# Patient Record
Sex: MALE | Race: WHITE | URBAN - METROPOLITAN AREA
[De-identification: names, ages, dates, MRNs, and addresses within clinical notes are randomized per-mention and may not be internally consistent; named-entity substitution may affect disease eponyms.]

---

## 2017-02-02 DIAGNOSIS — F90.0 ATTENTION DEFICIT HYPERACTIVITY DISORDER (ADHD), PREDOMINANTLY INATTENTIVE TYPE: Primary | ICD-10-CM

## 2017-02-02 RX ORDER — METHYLPHENIDATE HYDROCHLORIDE 10 MG/1
10 CAPSULE, EXTENDED RELEASE ORAL EVERY MORNING
Qty: 30 CAPSULE | Refills: 0 | Status: SHIPPED | OUTPATIENT
Start: 2017-02-02 | End: 2017-03-01

## 2017-02-02 NOTE — TELEPHONE ENCOUNTER
Reason for call: Medication   If this is a refill request, has the caller requested the refill from the pharmacy already? No  Will the patient be using a [facility]? No  Name of the pharmacy and phone number for the current request: They pick this script up at clinic    Name of the medication requested: Metadate    Other request: Please call leah Hwang at [phone] after this approved    Phone Number Pt can be reached at: Other phone number:  [phone]  Best Time: anytime  Can we leave a detailed message on this number? YES

## 2017-02-02 NOTE — TELEPHONE ENCOUNTER
methylphenidate (METADATE CD) 10 MG CR capsule      Last Written Prescription Date:  12/28/2016  Last Fill Quantity: 30,   # refills: 0  Last [place] with [facility] or [facility] prescribing provider: 9/07/2016  Future Office visit:       Routing refill request to provider for review/approval because:  Drug not on the [facility] or [facility] refill protocol or controlled substance

## 2017-02-27 ENCOUNTER — OFFICE VISIT (OUTPATIENT)
Dept: INTERNAL MEDICINE | Facility: CLINIC | Age: 22
End: 2017-02-27
Payer: COMMERCIAL

## 2017-02-27 VITALS
OXYGEN SATURATION: 98 % | BODY MASS INDEX: 30.71 KG/M2 | HEART RATE: 104 BPM | WEIGHT: 202.6 LBS | DIASTOLIC BLOOD PRESSURE: 74 MMHG | TEMPERATURE: 98.5 F | HEIGHT: 68 IN | SYSTOLIC BLOOD PRESSURE: 104 MMHG

## 2017-02-27 DIAGNOSIS — R68.89 FLU-LIKE SYMPTOMS: ICD-10-CM

## 2017-02-27 DIAGNOSIS — R50.9 FEVER, UNSPECIFIED: Primary | ICD-10-CM

## 2017-02-27 LAB
FLUAV+FLUBV AG SPEC QL: NEGATIVE
FLUAV+FLUBV AG SPEC QL: NORMAL
SPECIMEN SOURCE: NORMAL

## 2017-02-27 PROCEDURE — 87804 INFLUENZA ASSAY W/OPTIC: CPT | Mod: 59 | Performed by: PHYSICIAN ASSISTANT

## 2017-02-27 PROCEDURE — 99213 OFFICE O/P EST LOW 20 MIN: CPT | Performed by: PHYSICIAN ASSISTANT

## 2017-02-27 NOTE — NURSING NOTE
"Chief Complaint   Patient presents with     Cough     x4 days      Fever     x4 days        Initial /74 (BP Location: Left arm, Patient Position: Chair, Cuff Size: Adult Regular)  Pulse 104  Temp 98.5  F (36.9  C) (Oral)  Ht 5' 8\" (1.727 m)  Wt 202 lb 9.6 oz (91.9 kg)  SpO2 98%  BMI 30.81 kg/m2 Estimated body mass index is 30.81 kg/(m^2) as calculated from the following:    Height as of this encounter: 5' 8\" (1.727 m).    Weight as of this encounter: 202 lb 9.6 oz (91.9 kg).  Medication Reconciliation: complete    "

## 2017-02-27 NOTE — MR AVS SNAPSHOT
"              After Visit Summary   2017    Emeterio Mishra    MRN: [id number]           Patient Information     Date Of Birth          1995        Visit Information        Provider Department      2017 1:00 PM Lavern Cobb PA-C [facility]        Today's Diagnoses     Fever, unspecified    -  1    Flu-like symptoms          Care Instructions    Delsym -   Mucinex DM - tab         Follow-ups after your visit        Who to contact     If you have questions or need follow up information about today's clinic visit or your schedule please contact [facility] directly at [phone].  Normal or non-critical lab and imaging results will be communicated to you by MyChart, letter or phone within 4 business days after the clinic has received the results. If you do not hear from us within 7 days, please contact the clinic through MyChart or phone. If you have a critical or abnormal lab result, we will notify you by phone as soon as possible.  Submit refill requests through [employer] or call your pharmacy and they will forward the refill request to us. Please allow 3 business days for your refill to be completed.          Additional Information About Your Visit        MyChart Information     [employer] lets you send messages to your doctor, view your test results, renew your prescriptions, schedule appointments and more. To sign up, go to [email or web address] . Click on \"Log in\" on the left side of the screen, which will take you to the Welcome page. Then click on \"Sign up Now\" on the right side of the page.     You will be asked to enter the access code listed below, as well as some personal information. Please follow the directions to create your username and password.     Your access code is: [email or web address]  Expires: 2017  1:39 PM     Your access code will  in 90 days. If you need help or a new code, please call your [facility] or " "[phone].        Care EveryWhere ID     This is your Care EveryWhere ID. This could be used by other organizations to access your [place] medical records  [id number]        Your Vitals Were     Pulse Temperature Height Pulse Oximetry BMI (Body Mass Index)       104 98.5  F (36.9  C) (Oral) 5' 8\" (1.727 m) 98% 30.81 kg/m2        Blood Pressure from Last 3 Encounters:   02/27/17 104/74   09/07/16 118/88   08/11/16 126/84    Weight from Last 3 Encounters:   02/27/17 202 lb 9.6 oz (91.9 kg)   09/07/16 198 lb (89.8 kg)   08/11/16 203 lb (92.1 kg)              We Performed the Following     Influenza A/B antigen        Primary Care Provider Office Phone # Fax #    Kenny Stubbs [phone]       [facility] [address] [facility] [address]        Thank you!     Thank you for choosing [facility]  for your care. Our goal is always to provide you with excellent care. Hearing back from our patients is one way we can continue to improve our services. Please take a few minutes to complete the written survey that you may receive in the mail after your visit with us. Thank you!             Your Updated Medication List - Protect others around you: Learn how to safely use, store and throw away your medicines at www.disposemymeds.org.          This list is accurate as of: 2/27/17  1:39 PM.  Always use your most recent med list.                   Brand Name Dispense Instructions for use    albuterol 108 (90 BASE) MCG/ACT Inhaler    PROAIR HFA/PROVENTIL HFA/VENTOLIN HFA    1 Inhaler    Inhale 2 puffs into the lungs every 6 hours as needed for shortness of breath / dyspnea or wheezing       citalopram 20 MG tablet    celeXA    90 tablet    Take 1 tablet (20 mg) by mouth daily       CLARITIN 10 MG tablet   Generic drug:  loratadine      Take 10 mg by mouth daily.       methylphenidate 10 MG CR capsule    METADATE CD    30 capsule    Take 1 capsule (10 mg) by mouth " every morning

## 2017-02-27 NOTE — PROGRESS NOTES
"  SUBJECTIVE:                                                    Emeterio Mishra is a 21 year old male who presents to clinic today for the following health issues:      Acute Illness   Acute illness concerns: cough/fever/weak  Onset: x4 days worsening symptoms of chills sweats.   Started with coughing and sore throat last week about 6 days ago     Fever: YES last night 100.4    Chills/Sweats: YES    Headache (location?): YES- a little bit    Sinus Pressure:no    Conjunctivitis:  no    Ear Pain: YES: both- pressure    Rhinorrhea: no     Congestion: yes chest    Sore Throat: YES     Cough: YES- some phlegm noted.    Wheeze: YES- a little bit    Decreased Appetite: YES    Nausea: no     Vomiting: no    Diarrhea:  no    Dysuria/Freq.: no    Fatigue/Achiness: YES    Sick/Strep Exposure: YES- step brother      Therapies Tried and outcome: ibuprofen   Robitussin       -------------------------------------    Problem list and histories reviewed & adjusted, as indicated.  Additional history: as documented    Labs reviewed in EPIC  Problem list, Medication list, Allergies, and Medical/Social/Surgical histories reviewed in [facility] and updated as appropriate.    ROS:  Constitutional, HEENT, cardiovascular, pulmonary, gi and gu systems are negative, except as otherwise noted.    OBJECTIVE:                                                    /74 (BP Location: Left arm, Patient Position: Chair, Cuff Size: Adult Regular)  Pulse 104  Temp 98.5  F (36.9  C) (Oral)  Ht 5' 8\" (1.727 m)  Wt 202 lb 9.6 oz (91.9 kg)  SpO2 98%  BMI 30.81 kg/m2  Body mass index is 30.81 kg/(m^2).  GENERAL: healthy, alert and no distress  HENT: normal cephalic/atraumatic, ear canals and TM's normal, nose and mouth without ulcers or lesions, rhinorrhea clear, oropharynx clear and oral mucous membranes moist  NECK: no adenopathy, no asymmetry, masses, or scars and thyroid normal to palpation  RESP: lungs clear to auscultation - no rales, rhonchi or " wheezes  CV: regular rate and rhythm, normal S1 S2, no S3 or S4, no murmur, click or rub, no peripheral edema and peripheral pulses strong  MS: no gross musculoskeletal defects noted, no edema  SKIN: no suspicious lesions or rashes    Diagnostic Test Results:  Results for orders placed or performed in visit on 02/27/17 (from the past 24 hour(s))   Influenza A/B antigen   Result Value Ref Range    Influenza A/B Agn Specimen Nasopharyngeal     Influenza A Negative NEG    Influenza B  NEG     Negative   Test results must be correlated with clinical data. If necessary, results   should be confirmed by a molecular assay or viral culture.          ASSESSMENT/PLAN:                                                            1. Fever, unspecified    - Influenza A/B antigen    2. Flu-like symptoms    Fluids rest and monitor  Home until no fever x 24 hours  Recheck prn not improving     See Patient Instructions    Lavern Cobb PA-C  [facility]

## 2017-03-01 DIAGNOSIS — F90.0 ATTENTION DEFICIT HYPERACTIVITY DISORDER (ADHD), PREDOMINANTLY INATTENTIVE TYPE: ICD-10-CM

## 2017-03-01 RX ORDER — METHYLPHENIDATE HYDROCHLORIDE 10 MG/1
10 CAPSULE, EXTENDED RELEASE ORAL EVERY MORNING
Qty: 30 CAPSULE | Refills: 0 | Status: SHIPPED | OUTPATIENT
Start: 2017-03-01 | End: 2017-05-22

## 2017-03-01 NOTE — TELEPHONE ENCOUNTER
Patient needs paper prescription for his medication metadate needs by Friday 3/3/17 please address asap please call pts mom let her know when this is ready phone [phone] will pickup at front in im

## 2017-03-01 NOTE — TELEPHONE ENCOUNTER
methylphenidate (METADATE CD) 10 MG CR capsule  Last Written Prescription Date:  2/2/17  Last Fill Quantity: 30,   # refills: 0  Last [place] [facility] [facility] or [facility] prescribing provider: 8/11/16  Future Office visit:       Routing refill request to provider for review/approval because:  Drug not on the [facility], [facility] or [facility] refill protocol or controlled substance

## 2017-04-24 ENCOUNTER — OFFICE VISIT (OUTPATIENT)
Dept: INTERNAL MEDICINE | Facility: CLINIC | Age: 22
End: 2017-04-24
Payer: COMMERCIAL

## 2017-04-24 VITALS
BODY MASS INDEX: 30.74 KG/M2 | OXYGEN SATURATION: 96 % | DIASTOLIC BLOOD PRESSURE: 82 MMHG | WEIGHT: 202.2 LBS | SYSTOLIC BLOOD PRESSURE: 116 MMHG | TEMPERATURE: 98.4 F | HEART RATE: 77 BPM

## 2017-04-24 DIAGNOSIS — J18.9 PNEUMONIA DUE TO INFECTIOUS ORGANISM, UNSPECIFIED LATERALITY, UNSPECIFIED PART OF LUNG: Primary | ICD-10-CM

## 2017-04-24 PROCEDURE — 99213 OFFICE O/P EST LOW 20 MIN: CPT | Performed by: PHYSICIAN ASSISTANT

## 2017-04-24 RX ORDER — DOXYCYCLINE 100 MG/1
100 CAPSULE ORAL 2 TIMES DAILY
Qty: 6 CAPSULE | Refills: 0 | Status: SHIPPED | OUTPATIENT
Start: 2017-04-24

## 2017-04-24 NOTE — PROGRESS NOTES
SUBJECTIVE:                                                    Emeterio Mishra is a 21 year old male who presents to clinic today for the following health issues:      [facility]. He was running a high fever of 102.2 on Friday. He went through the weekend with a low grade fever, and then [facility] and based off of the symptoms he told them they had told them he had pneumonia.       RESPIRATORY SYMPTOMS      Duration: 3 days     fevers and shortness of breath started the illness     Description  Vomiting/ diarrhea x 1 day later- fever yet that day.       Severity: moderate    Accompanying signs and symptoms: no wheezing noted, Has not used his inhaler     History (predisposing factors):  Painful respiration this weekend. SOB     Precipitating or alleviating factors: started on doxycycline for pneumonia  [facility] for a 7 day course only     Therapies tried and outcome:  Feels that SOB is better today.     Fever is better today.        Mother with to appt today too.   Both with questions/ does he need an xray today or not. And concerns about frequent illness. - last seen here 2 months ago for febrile illness - flu test neg then.   Stress with work and with living situation.         Problem list and histories reviewed & adjusted, as indicated.  Additional history: as documented    Labs reviewed in EPIC    Reviewed and updated as needed this visit by clinical staff  Allergies       Reviewed and updated as needed this visit by Provider         ROS:  Constitutional, HEENT, cardiovascular, pulmonary, gi and gu systems are negative, except as otherwise noted.    OBJECTIVE:                                                    /82 (BP Location: Left arm, Patient Position: Chair)  Pulse 77  Temp 98.4  F (36.9  C)  Wt 202 lb 3.2 oz (91.7 kg)  SpO2 96%  BMI 30.74 kg/m2  Body mass index is 30.74 kg/(m^2).  GENERAL: healthy, alert and no distress  HENT: ear  canals and TM's normal, nose and mouth without ulcers or lesions  NECK: no adenopathy, no asymmetry, masses, or scars and thyroid normal to palpation  RESP: no wheezes and rhonchi bilateral and upper left and right posterior chest.     CV: regular rates and rhythm and normal S1 S2, no S3 or S4  SKIN: no suspicious lesions or rashes    Diagnostic Test Results:  none      ASSESSMENT/PLAN:                                                            1. Pneumonia due to infectious organism, unspecified laterality, unspecified part of lung    - XR Chest 2 Views; Future  - doxycycline (VIBRAMYCIN) 100 MG capsule; Take 1 capsule (100 mg) by mouth 2 times daily  Dispense: 6 capsule; Refill: 0    Reviewed options regarding xrays- at this point clinically has pneumonia, and is responding to abx. No fever today.   Would add additional 3 days of therapy though - done  Xrays in 2 weeks if still with coughing, and would need appt after to review results if abnormal. ( concern today about out of pocket costs)  Discussion about illnesses this winter. - no work up at this point, but if ill this summer would be more concerned.         Lavern Cobb PA-C  [facility]

## 2017-04-24 NOTE — MR AVS SNAPSHOT
"              After Visit Summary   4/24/2017    Emeterio Mishra    MRN: [id number]           Patient Information     Date Of Birth          1995        Visit Information        Provider Department      4/24/2017 4:40 PM Lavern Cobb PA-C [facility]        Today's Diagnoses     Pneumonia due to infectious organism, unspecified laterality, unspecified part of lung    -  1      Care Instructions    Additional 3 days of antibiotics   If not totally improved in 2 weeks then suggest an xray - order placed - You will need to call.         Follow-ups after your visit        Future tests that were ordered for you today     Open Future Orders        Priority Expected Expires Ordered    XR Chest 2 Views STAT 5/8/2017 4/24/2018 4/24/2017            Who to contact     If you have questions or need follow up information about today's clinic visit or your schedule please contact [facility] directly at [phone].  Normal or non-critical lab and imaging results will be communicated to you by [employer], letter or phone within 4 business days after the clinic has received the results. If you do not hear from us within 7 days, please contact the clinic through [employer] or phone. If you have a critical or abnormal lab result, we will notify you by phone as soon as possible.  Submit refill requests through [employer] or call your pharmacy and they will forward the refill request to us. Please allow 3 business days for your refill to be completed.          Additional Information About Your Visit        [email or web address] Information     [employer] lets you send messages to your doctor, view your test results, renew your prescriptions, schedule appointments and more. To sign up, go to [email or web address] . Click on \"Log in\" on the left side of the screen, which will take you to the Welcome page. Then click on \"Sign up Now\" on the right side of the page.     You will be asked to enter " the access code listed below, as well as some personal information. Please follow the directions to create your username and password.     Your access code is: [email or web address]  Expires: 2017  2:39 PM     Your access code will  in 90 days. If you need help or a new code, please call your [place] clinic or [phone].        Care EveryWhere ID     This is your Care EveryWhere ID. This could be used by other organizations to access your [place] medical records  [id number]         Blood Pressure from Last 3 Encounters:   17 104/74   16 118/88   16 126/84    Weight from Last 3 Encounters:   17 202 lb 9.6 oz (91.9 kg)   16 198 lb (89.8 kg)   16 203 lb (92.1 kg)                 Today's Medication Changes          These changes are accurate as of: 17  5:10 PM.  If you have any questions, ask your nurse or doctor.               Start taking these medicines.        Dose/Directions    doxycycline 100 MG capsule   Commonly known as:  VIBRAMYCIN   Used for:  Pneumonia due to infectious organism, unspecified laterality, unspecified part of lung        Dose:  100 mg   Take 1 capsule (100 mg) by mouth 2 times daily   Quantity:  6 capsule   Refills:  0            Where to get your medicines      These medications were sent to [facility] [address] [address]     Phone:  [phone]     doxycycline 100 MG capsule                Primary Care Provider Office Phone # Fax #    Kenny Stubbs [phone]       [address] [address]        Thank you!     Thank you for choosing [facility]  for your care. Our goal is always to provide you with excellent care. Hearing back from our patients is one way we can continue to improve our services. Please take a few minutes to complete the written survey that you may receive in the mail after  your visit with us. Thank you!             Your Updated Medication List - Protect others around you: Learn how to safely use, store and throw away your medicines at www.disposemymeds.org.          This list is accurate as of: 4/24/17  5:10 PM.  Always use your most recent med list.                   Brand Name Dispense Instructions for use    albuterol 108 (90 BASE) MCG/ACT Inhaler    PROAIR HFA/PROVENTIL HFA/VENTOLIN HFA    1 Inhaler    Inhale 2 puffs into the lungs every 6 hours as needed for shortness of breath / dyspnea or wheezing       citalopram 20 MG tablet    celeXA    90 tablet    Take 1 tablet (20 mg) by mouth daily       CLARITIN 10 MG tablet   Generic drug:  loratadine      Take 10 mg by mouth daily.       doxycycline 100 MG capsule    VIBRAMYCIN    6 capsule    Take 1 capsule (100 mg) by mouth 2 times daily       methylphenidate 10 MG CR capsule    METADATE CD    30 capsule    Take 1 capsule (10 mg) by mouth every morning

## 2017-04-24 NOTE — NURSING NOTE
"Chief Complaint   Patient presents with     [facility] F/U     [facility]       Initial There were no vitals taken for this visit. Estimated body mass index is 30.81 kg/(m^2) as calculated from the following:    Height as of 2/27/17: 5' 8\" (1.727 m).    Weight as of 2/27/17: 202 lb 9.6 oz (91.9 kg).  Medication Reconciliation: complete    "

## 2017-04-24 NOTE — PATIENT INSTRUCTIONS
Additional 3 days of antibiotics   If not totally improved in 2 weeks then suggest an xray - order placed - You will need to call.

## 2017-05-22 DIAGNOSIS — F90.0 ATTENTION DEFICIT HYPERACTIVITY DISORDER (ADHD), PREDOMINANTLY INATTENTIVE TYPE: ICD-10-CM

## 2017-05-22 RX ORDER — METHYLPHENIDATE HYDROCHLORIDE 10 MG/1
10 CAPSULE, EXTENDED RELEASE ORAL EVERY MORNING
Qty: 30 CAPSULE | Refills: 0 | Status: SHIPPED | OUTPATIENT
Start: 2017-05-22 | End: 2017-06-29

## 2017-05-22 NOTE — TELEPHONE ENCOUNTER
Reason for call: Medication   If this is a refill request, has the caller requested the refill from the pharmacy already? No  Will the patient be using a [facility] [facility]? No  Name of the pharmacy and phone number for the current request: patient will pick this up at [facility] on the second floor desk    Name of the medication requested: Metadate    Other request: Please call leah Chamberlain when approved, so she can let patient pick it up, as he is out right now.    Phone Number Pt can be reached at:[phone] leah Chamberlain  Best Time: anytime  Can we leave a detailed message on this number? YES

## 2017-05-22 NOTE — TELEPHONE ENCOUNTER
methylphenidate (METADATE CD) 10 MG CR capsule  Last Written Prescription Date: 03/01/2017  Last Fill Quantity: 30,  # refills: 0   Last Office Visit with [facility], L[facility] or [facility] prescribing provider: 04/24/2017

## 2017-06-29 DIAGNOSIS — F90.0 ATTENTION DEFICIT HYPERACTIVITY DISORDER (ADHD), PREDOMINANTLY INATTENTIVE TYPE: ICD-10-CM

## 2017-06-29 RX ORDER — METHYLPHENIDATE HYDROCHLORIDE 10 MG/1
10 CAPSULE, EXTENDED RELEASE ORAL EVERY MORNING
Qty: 30 CAPSULE | Refills: 0 | Status: SHIPPED | OUTPATIENT
Start: 2017-06-29 | End: 2017-08-07

## 2017-06-29 NOTE — TELEPHONE ENCOUNTER
Controlled Substance Refill Request for methylphenidate (METADATE CD) 10 MG CR capsule  Problem List Complete:  Yes    Last Written Prescription Date:  5/22/2017  Last Fill Quantity: 30,   # refills: 0    Last Office Visit with [facility] primary care provider: 4/24/2017    Clinic visit frequency required: Q 6  months     Future Office visit:     Controlled substance agreement on file: Yes:  Date 8/11/2016.     Processing:  Patient will  in clinic   checked in past 6 months?  No, route to RN     Noted:  8/11/2016       Priority:  Medium      Overview Signed 8/11/2016  4:18 PM by Antelmo Stubbs MD     Patient is followed by ANTELMO STUBBS for ongoing prescription of stimulants.  All refills should be approved by this provider, or covering partner.     Medication(s): Adderal.   Maximum quantity per month: 30  Clinic visit frequency required: Q 6  months      Controlled substance agreement on file: Yes       Date(s): 8/11/16  Neuropsych evaluation for ADD completed:  Yes, completed in past, not on file     Last [facility] website verification:  none   [email or web address]     Please place script at IM  and call pts mom Cintia @ [phone]

## 2017-08-07 DIAGNOSIS — F90.0 ATTENTION DEFICIT HYPERACTIVITY DISORDER (ADHD), PREDOMINANTLY INATTENTIVE TYPE: ICD-10-CM

## 2017-08-07 RX ORDER — METHYLPHENIDATE HYDROCHLORIDE 10 MG/1
10 CAPSULE, EXTENDED RELEASE ORAL EVERY MORNING
Qty: 30 CAPSULE | Refills: 0 | Status: SHIPPED | OUTPATIENT
Start: 2017-08-07 | End: 2017-09-18

## 2017-08-07 NOTE — TELEPHONE ENCOUNTER
Controlled Substance Refill Request for methylphenidate  Problem List Complete:  Yes    Last Written Prescription Date:  06/29/17  Last Fill Quantity: 30,   # refills: 0    Last Office Visit with [facility] primary care provider: 05/23/17    Clinic visit frequency required: Q 3 months     Future Office visit: 0    Controlled substance agreement on file: Yes:  Date 08/11/16.     Processing:  Patient will  in clinic     checked in past 6 months?  No, route to RN

## 2017-09-18 DIAGNOSIS — F90.0 ATTENTION DEFICIT HYPERACTIVITY DISORDER (ADHD), PREDOMINANTLY INATTENTIVE TYPE: ICD-10-CM

## 2017-09-18 NOTE — TELEPHONE ENCOUNTER
Mother will like call when rx is ready to  @ [phone]    methylphenidate (METADATE CD) 10 MG CR capsule      Last Written Prescription Date:  08/17/2017  Last Fill Quantity: 30,   # refills: 0  Last Office Visit with [facility], [facility] or [facility] prescribing provider: 05/23/2017  Future Office visit:       Routing refill request to provider for review/approval because:  Drug not on the [facility], [facility] or [facility] refill protocol or controlled substance

## 2017-09-19 DIAGNOSIS — F41.1 GENERALIZED ANXIETY DISORDER: ICD-10-CM

## 2017-09-19 RX ORDER — METHYLPHENIDATE HYDROCHLORIDE 10 MG/1
10 CAPSULE, EXTENDED RELEASE ORAL EVERY MORNING
Qty: 30 CAPSULE | Refills: 0 | Status: SHIPPED | OUTPATIENT
Start: 2017-09-19 | End: 2017-11-01

## 2017-09-19 RX ORDER — CITALOPRAM HYDROBROMIDE 20 MG/1
TABLET ORAL
Qty: 90 TABLET | Refills: 2 | Status: SHIPPED | OUTPATIENT
Start: 2017-09-19 | End: 2018-10-18

## 2017-09-19 NOTE — TELEPHONE ENCOUNTER
Celexa 20 mg     Last Written Prescription Date: 8/11/16  Last Fill Quantity: 90, # refills: 3  Last Office Visit with [facility] primary care provider:  5/23/17        Last PHQ-9 score on record= No flowsheet data found.

## 2017-11-01 DIAGNOSIS — F90.0 ATTENTION DEFICIT HYPERACTIVITY DISORDER (ADHD), PREDOMINANTLY INATTENTIVE TYPE: ICD-10-CM

## 2017-11-01 RX ORDER — METHYLPHENIDATE HYDROCHLORIDE 10 MG/1
10 CAPSULE, EXTENDED RELEASE ORAL EVERY MORNING
Qty: 30 CAPSULE | Refills: 0 | Status: SHIPPED | OUTPATIENT
Start: 2017-11-01 | End: 2017-12-08

## 2017-11-01 NOTE — TELEPHONE ENCOUNTER
metadate    Last Written Prescription Date:  9/19/17  Last Fill Quantity: 30,   # refills: 0  Future Office visit:       Routing refill request to provider for review/approval because:  Drug not on the [facility], [facility] or [facility] refill protocol or controlled substance

## 2017-12-08 DIAGNOSIS — F90.0 ATTENTION DEFICIT HYPERACTIVITY DISORDER (ADHD), PREDOMINANTLY INATTENTIVE TYPE: ICD-10-CM

## 2017-12-08 RX ORDER — METHYLPHENIDATE HYDROCHLORIDE 10 MG/1
10 CAPSULE, EXTENDED RELEASE ORAL EVERY MORNING
Qty: 30 CAPSULE | Refills: 0 | Status: SHIPPED | OUTPATIENT
Start: 2017-12-08 | End: 2018-01-24

## 2017-12-08 NOTE — TELEPHONE ENCOUNTER
Controlled Substance Refill Request for methylphenidate 10mg  Problem List Complete:  Yes    Last Written Prescription Date:  11/01/17  Last Fill Quantity: 30,   # refills: 0    Last Office Visit with [facility] primary care provider: 05/23/17    Clinic visit frequency required: Q 3 months     Future Office visit: 0    Controlled substance agreement on file: Yes:  Date 08/11/16.     Processing:  Patient will  in clinic  Would like today please call Cintia when ready tele# [phone]     checked in past 6 months?  No, route to RN

## 2018-01-24 DIAGNOSIS — F90.0 ATTENTION DEFICIT HYPERACTIVITY DISORDER (ADHD), PREDOMINANTLY INATTENTIVE TYPE: ICD-10-CM

## 2018-01-24 RX ORDER — METHYLPHENIDATE HYDROCHLORIDE 10 MG/1
10 CAPSULE, EXTENDED RELEASE ORAL EVERY MORNING
Qty: 30 CAPSULE | Refills: 0 | Status: SHIPPED | OUTPATIENT
Start: 2018-01-24 | End: 2018-03-20

## 2018-01-24 NOTE — TELEPHONE ENCOUNTER
Requested Prescriptions   Pending Prescriptions Disp Refills     methylphenidate (METADATE CD) 10 MG CR capsule 30 capsule 0     Sig: Take 1 capsule (10 mg) by mouth every morning    There is no refill protocol information for this order      Controlled Substance Refill Request for methylphenidate metadate 10mg  Problem List Complete:  Yes    Last Written Prescription Date:  12/08/17  Last Fill Quantity: 30,   # refills: 0    Last Office Visit with [facility] primary care provider: 05/23/17    Clinic visit frequency required: Q 3 months     Future Office visit: 0    Controlled substance agreement on file: Yes:  Date 08/11/16.     Processing:  Patient will  in clinic   checked in past 6 months?  No, route to RN

## 2018-01-30 NOTE — TELEPHONE ENCOUNTER
Searched for 15 minutes, unable to find ANY PA requests for pt.    Please ask for pt's pharmacy and well as [facility], [facility] and [facility].

## 2018-01-30 NOTE — TELEPHONE ENCOUNTER
Prior authorization    Medication name metadate  Insurance med co health/[employer] (acquired by [employer])  Insurance ID number [id number]  Prior authorization faxed through cover my meds  samuel wall (Berg: [id number])   This request has been approved.  Outcome : Approved    Confirmed with pharmacy that this went thru. Left detailed message for Mom, Cintia, that PA was approved.

## 2018-01-30 NOTE — TELEPHONE ENCOUNTER
"Unable to find correct PA form with just the insurance information \"[employer]\". informed Mom that this can take up to one week and pharmacy should be able to give temporary supply off of physical script.   Asked Mom to call back with BIN, PCN and [employer] number to find correct PA form. Please route information to Ox Prior Auth.  "

## 2018-01-30 NOTE — TELEPHONE ENCOUNTER
Mother is calling about the refill for metadate. Pt has been with out medication for 4 days.  His new ins company is requiring a prior auth.  His new ins company is called [employer].  That company has sent 2 faxes, one on 1/26 and another on 1/29.  Please call mom back at [phone].  She is wondering if the pt can purchase a few pills until the prior auth is approved.

## 2018-02-02 ENCOUNTER — TELEPHONE (OUTPATIENT)
Dept: INTERNAL MEDICINE | Facility: CLINIC | Age: 23
End: 2018-02-02

## 2018-02-02 NOTE — TELEPHONE ENCOUNTER
Prior authorization    Medication name methylphenidate  Insurance express scripts  Insurance ID number [id number]  Prior authorization faxed through cover my meds

## 2018-03-20 DIAGNOSIS — F90.0 ATTENTION DEFICIT HYPERACTIVITY DISORDER (ADHD), PREDOMINANTLY INATTENTIVE TYPE: ICD-10-CM

## 2018-03-20 RX ORDER — METHYLPHENIDATE HYDROCHLORIDE 10 MG/1
10 CAPSULE, EXTENDED RELEASE ORAL EVERY MORNING
Qty: 30 CAPSULE | Refills: 0 | Status: SHIPPED | OUTPATIENT
Start: 2018-03-20 | End: 2018-05-11

## 2018-03-20 NOTE — TELEPHONE ENCOUNTER
Please call mother when rx is ready to  due to patients work schedule he may not beable to answer at the time or receive a VM. . [phone]    methylphenidate (METADATE CD) 10 MG CR capsule      Last Written Prescription Date:  01/24/2018  Last Fill Quantity: 30,   # refills: 0  Last Office Visit: 05/23/2017  Future Office visit:       Routing refill request to provider for review/approval because:  Drug not on the G, P or [facility] refill protocol or controlled substance

## 2018-05-11 DIAGNOSIS — F90.0 ATTENTION DEFICIT HYPERACTIVITY DISORDER (ADHD), PREDOMINANTLY INATTENTIVE TYPE: ICD-10-CM

## 2018-05-11 RX ORDER — METHYLPHENIDATE HYDROCHLORIDE 10 MG/1
10 CAPSULE, EXTENDED RELEASE ORAL EVERY MORNING
Qty: 30 CAPSULE | Refills: 0 | Status: SHIPPED | OUTPATIENT
Start: 2018-05-11 | End: 2018-06-13

## 2018-05-11 NOTE — TELEPHONE ENCOUNTER
Reason for call:  Medication   If this is a refill request, has the caller requested the refill from the pharmacy already? No  Will the patient be using a [facility]? No  Name of the pharmacy and phone number for the current request: He picks the script at the [facility]    Name of the medication requested: Metadate    Other request: Call when approved as he is out of the meds now    Phone number to reach patient:  Other phone number:  [phone], leah Chamberlain    Best Time:  anytime    Can we leave a detailed message on this number?  YES

## 2018-06-13 DIAGNOSIS — F90.0 ATTENTION DEFICIT HYPERACTIVITY DISORDER (ADHD), PREDOMINANTLY INATTENTIVE TYPE: ICD-10-CM

## 2018-06-13 RX ORDER — METHYLPHENIDATE HYDROCHLORIDE 10 MG/1
10 CAPSULE, EXTENDED RELEASE ORAL EVERY MORNING
Qty: 30 CAPSULE | Refills: 0 | Status: SHIPPED | OUTPATIENT
Start: 2018-06-13 | End: 2018-08-28

## 2018-06-13 NOTE — TELEPHONE ENCOUNTER
methylphenidate (METADATE CD) 10 MG CR capsule      Last Written Prescription Date:  05/11/2018  Last Fill Quantity: 30,   # refills: 0  Last Office Visit: 05/23/2017  Future Office visit:       Routing refill request to provider for review/approval because:  Drug not on the [facility], P or [facility] refill protocol or controlled substance

## 2018-08-28 DIAGNOSIS — F90.0 ATTENTION DEFICIT HYPERACTIVITY DISORDER (ADHD), PREDOMINANTLY INATTENTIVE TYPE: ICD-10-CM

## 2018-08-28 RX ORDER — METHYLPHENIDATE HYDROCHLORIDE 10 MG/1
10 CAPSULE, EXTENDED RELEASE ORAL EVERY MORNING
Qty: 30 CAPSULE | Refills: 0 | Status: SHIPPED | OUTPATIENT
Start: 2018-08-28 | End: 2018-10-10

## 2018-08-28 NOTE — TELEPHONE ENCOUNTER
Requested Prescriptions   Pending Prescriptions Disp Refills     methylphenidate (METADATE CD) 10 MG CR capsule 30 capsule 0     Sig: Take 1 capsule (10 mg) by mouth every morning    There is no refill protocol information for this order      Controlled Substance Refill Request for metadate  Problem List Complete:  Yes    Last Written Prescription Date:  06/13/18  Last Fill Quantity: 30,   # refills: 0    Last Office Visit with [facility] primary care provider: 05/23/17    Clinic visit frequency required: Q 3 months     Future Office visit: 0    Controlled substance agreement on file: Yes:  Date 08/11/16.     Processing:  Patient will  in clinic call mother Cintia when ready at [phone]   checked in past 3 months?  No, route to RN

## 2018-10-10 DIAGNOSIS — F90.0 ATTENTION DEFICIT HYPERACTIVITY DISORDER (ADHD), PREDOMINANTLY INATTENTIVE TYPE: ICD-10-CM

## 2018-10-10 RX ORDER — METHYLPHENIDATE HYDROCHLORIDE 10 MG/1
10 CAPSULE, EXTENDED RELEASE ORAL EVERY MORNING
Qty: 30 CAPSULE | Refills: 0 | Status: SHIPPED | OUTPATIENT
Start: 2018-10-10 | End: 2018-12-12

## 2018-10-10 NOTE — TELEPHONE ENCOUNTER
methylphenidate (METADATE CD) 10 MG CR capsule  Last Written Prescription Date:  8/28/18  Last Fill Quantity: 30,   # refills: 0  Last Office Visit: 5/23/17  Future Office visit:       Routing refill request to provider for review/approval because:  Drug not on the [facility], P or [facility] refill protocol or controlled substance

## 2018-10-10 NOTE — TELEPHONE ENCOUNTER
Reason for Call:  Medication or medication refill:    Do you use a [facility]?  Name of the pharmacy and phone number for the current request:  [facility] in [place]    Name of the medication requested: metadate    Other request:Pt to  in IM    Can we leave a detailed message on this number? YES    Phone number patient can be reached at: Other phone number:  [phone]--mother    Best Time: anytime    Call taken on 10/10/2018 at 12:35 PM by YONATAN CARROLL

## 2018-10-18 DIAGNOSIS — F41.1 GENERALIZED ANXIETY DISORDER: ICD-10-CM

## 2018-10-19 RX ORDER — CITALOPRAM HYDROBROMIDE 20 MG/1
TABLET ORAL
Qty: 30 TABLET | Refills: 0 | Status: SHIPPED | OUTPATIENT
Start: 2018-10-19 | End: 2018-12-12

## 2018-10-19 NOTE — TELEPHONE ENCOUNTER
"Requested Prescriptions   Pending Prescriptions Disp Refills     citalopram (CELEXA) 20 MG tablet [Pharmacy Med Name: Citalopram Hydrobromide Oral Tablet 20 MG]  Last Written Prescription Date:  09/19/2017  Last Fill Quantity: 90 tablet,  # refills: 2   Last office visit: 4/24/2017 with prescribing provider:  Lavern Cobb PA-C   Future Office Visit:     90 tablet 1     Sig: TAKE ONE TABLET BY MOUTH ONE TIME DAILY    SSRIs Protocol Failed    10/18/2018  5:05 PM       Failed - Recent (12 mo) or future (30 days) visit within the authorizing provider's specialty    Patient had office visit in the last 12 months or has a visit in the next 30 days with authorizing provider or within the authorizing provider's specialty.  See \"Patient Info\" tab in inbasket, or \"Choose Columns\" in Meds & Orders section of the refill encounter.             Passed - Patient is age 18 or older          "

## 2018-10-19 NOTE — TELEPHONE ENCOUNTER
Routing refill request to provider for review/approval because:  Patient needs to be seen because it has been more than 1 year since last office visit.    Mallory RINALDI RN, BSN, PHN

## 2018-12-12 ENCOUNTER — TELEPHONE (OUTPATIENT)
Dept: INTERNAL MEDICINE | Facility: CLINIC | Age: 23
End: 2018-12-12

## 2018-12-12 DIAGNOSIS — F90.0 ATTENTION DEFICIT HYPERACTIVITY DISORDER (ADHD), PREDOMINANTLY INATTENTIVE TYPE: ICD-10-CM

## 2018-12-12 DIAGNOSIS — F41.1 GENERALIZED ANXIETY DISORDER: ICD-10-CM

## 2018-12-12 RX ORDER — CITALOPRAM HYDROBROMIDE 20 MG/1
TABLET ORAL
Qty: 30 TABLET | Refills: 0 | OUTPATIENT
Start: 2018-12-12

## 2018-12-12 NOTE — TELEPHONE ENCOUNTER
Please verify which medication needs to be refilled and enter that medication as a refill request as the prior message states that the patient needs methylphenidate refill but the prior prescription requested was for citalopram.  This patient is also not been seen thus will need to follow-up for that appointment as scheduled.

## 2018-12-12 NOTE — TELEPHONE ENCOUNTER
"Requested Prescriptions   Pending Prescriptions Disp Refills     citalopram (CELEXA) 20 MG tablet [Pharmacy Med Name: Citalopram Hydrobromide Oral Tablet 20 MG] 30 tablet 0     Sig: take 1 tablet by mouth once daily    SSRIs Protocol Failed - 12/12/2018  7:35 AM       Failed - Recent (12 mo) or future (30 days) visit within the authorizing provider's specialty    Patient had office visit in the last 12 months or has a visit in the next 30 days with authorizing provider or within the authorizing provider's specialty.  See \"Patient Info\" tab in inbasket, or \"Choose Columns\" in Meds & Orders section of the refill encounter.             Passed - Patient is age 18 or older        Routing refill request to provider for review/approval because:  Ashia given x1 and patient did not follow up, please advise  Patient needs to be seen because it has been more than 1 year since last office visit.        "

## 2018-12-12 NOTE — TELEPHONE ENCOUNTER
Mother called and would like to ask if you could refill the Rx for methylphenidate (METADATE CD) An appt. has been set up   for him for 12/18 @ 2:20  Even if it's just a couple to get him to his appt.  Call mom [phone] when ready to  in I.M.

## 2018-12-13 RX ORDER — METHYLPHENIDATE HYDROCHLORIDE 10 MG/1
10 CAPSULE, EXTENDED RELEASE ORAL EVERY MORNING
Qty: 30 CAPSULE | Refills: 0 | Status: SHIPPED | OUTPATIENT
Start: 2018-12-13

## 2018-12-13 RX ORDER — CITALOPRAM HYDROBROMIDE 20 MG/1
TABLET ORAL
Qty: 30 TABLET | Refills: 0 | Status: SHIPPED | OUTPATIENT
Start: 2018-12-13

## 2018-12-13 RX ORDER — CITALOPRAM HYDROBROMIDE 20 MG/1
20 TABLET ORAL DAILY
Qty: 30 TABLET | Refills: 0 | OUTPATIENT
Start: 2018-12-13

## 2018-12-13 NOTE — TELEPHONE ENCOUNTER
Routing refill request to provider for review/approval because:  Ashia given x1 and patient did not follow up, please advise  Patient needs to be seen because it has been more than 1 year since last office visit.

## 2018-12-13 NOTE — TELEPHONE ENCOUNTER
"Requested Prescriptions   Pending Prescriptions Disp Refills     citalopram (CELEXA) 20 MG tablet [Pharmacy Med Name: Citalopram Hydrobromide Oral Tablet 20 MG]  Last Written Prescription Date:  10/19/2018  Last Fill Quantity: 30,  # refills: 0   Last Office Visit: 4/24/2017   Future Office Visit:    Next 5 appointments (look out 90 days)    Dec 18, 2018  2:00 PM CST  [facility] [address] [place] [id number] [phone]          30 tablet 0     Sig: take 1 tablet by mouth once daily    SSRIs Protocol Passed - 12/12/2018  6:32 PM       Passed - Recent (12 mo) or future (30 days) visit within the authorizing provider's specialty    Patient had office visit in the last 12 months or has a visit in the next 30 days with authorizing provider or within the authorizing provider's specialty.  See \"Patient Info\" tab in inbasket, or \"Choose Columns\" in Meds & Orders section of the refill encounter.             Passed - Patient is age 18 or older          "

## 2018-12-13 NOTE — TELEPHONE ENCOUNTER
It is both medications . Please refill both . Pt has an appt 12/18 . Please fill till 12/18 at least . Pt works on robotic machines and driving . Please do today . Pt goes to school in evening . Please call mother at [phone]. She will  prescription  .Gwendolyn Menendez RN

## 2019-02-14 ENCOUNTER — TELEPHONE (OUTPATIENT)
Dept: FAMILY MEDICINE | Facility: CLINIC | Age: 24
End: 2019-02-14

## 2019-02-14 ENCOUNTER — OFFICE VISIT (OUTPATIENT)
Dept: FAMILY MEDICINE | Facility: CLINIC | Age: 24
End: 2019-02-14
Payer: COMMERCIAL

## 2019-02-14 VITALS
OXYGEN SATURATION: 99 % | TEMPERATURE: 98.4 F | HEART RATE: 87 BPM | SYSTOLIC BLOOD PRESSURE: 125 MMHG | DIASTOLIC BLOOD PRESSURE: 89 MMHG | RESPIRATION RATE: 18 BRPM | WEIGHT: 213 LBS | HEIGHT: 68 IN | BODY MASS INDEX: 32.28 KG/M2

## 2019-02-14 DIAGNOSIS — F33.0 MILD RECURRENT MAJOR DEPRESSION (H): ICD-10-CM

## 2019-02-14 DIAGNOSIS — D22.9 ATYPICAL MOLE: ICD-10-CM

## 2019-02-14 DIAGNOSIS — F90.0 ATTENTION DEFICIT HYPERACTIVITY DISORDER (ADHD), PREDOMINANTLY INATTENTIVE TYPE: ICD-10-CM

## 2019-02-14 DIAGNOSIS — Z23 NEED FOR HPV VACCINATION: ICD-10-CM

## 2019-02-14 DIAGNOSIS — Z00.00 ROUTINE HISTORY AND PHYSICAL EXAMINATION OF ADULT: Primary | ICD-10-CM

## 2019-02-14 DIAGNOSIS — F98.8 ATTENTION DEFICIT DISORDER (ADD) WITHOUT HYPERACTIVITY: ICD-10-CM

## 2019-02-14 PROCEDURE — 99213 OFFICE O/P EST LOW 20 MIN: CPT | Mod: 25 | Performed by: FAMILY MEDICINE

## 2019-02-14 PROCEDURE — 99395 PREV VISIT EST AGE 18-39: CPT | Mod: 25 | Performed by: FAMILY MEDICINE

## 2019-02-14 PROCEDURE — 90471 IMMUNIZATION ADMIN: CPT | Performed by: FAMILY MEDICINE

## 2019-02-14 PROCEDURE — 90715 TDAP VACCINE 7 YRS/> IM: CPT | Performed by: FAMILY MEDICINE

## 2019-02-14 PROCEDURE — 90472 IMMUNIZATION ADMIN EACH ADD: CPT | Performed by: FAMILY MEDICINE

## 2019-02-14 PROCEDURE — 90651 9VHPV VACCINE 2/3 DOSE IM: CPT | Performed by: FAMILY MEDICINE

## 2019-02-14 RX ORDER — CITALOPRAM HYDROBROMIDE 20 MG/1
20 TABLET ORAL DAILY
Qty: 90 TABLET | Refills: 3 | Status: SHIPPED | OUTPATIENT
Start: 2019-02-14 | End: 2020-02-14

## 2019-02-14 RX ORDER — METHYLPHENIDATE HYDROCHLORIDE 10 MG/1
10 CAPSULE, EXTENDED RELEASE ORAL EVERY MORNING
Qty: 90 CAPSULE | Refills: 0 | Status: SHIPPED | OUTPATIENT
Start: 2019-02-14 | End: 2019-03-16

## 2019-02-14 ASSESSMENT — ENCOUNTER SYMPTOMS
HEMATURIA: 0
FEVER: 0
COUGH: 0
NERVOUS/ANXIOUS: 0
DIARRHEA: 0
EYE PAIN: 0
ARTHRALGIAS: 0
HEARTBURN: 0
NAUSEA: 0
WEAKNESS: 0
ABDOMINAL PAIN: 0
PARESTHESIAS: 0
MYALGIAS: 0
PALPITATIONS: 0
DYSURIA: 0
JOINT SWELLING: 0
HEMATOCHEZIA: 0
SORE THROAT: 0
CONSTIPATION: 0
FREQUENCY: 0
DIZZINESS: 0
SHORTNESS OF BREATH: 0
HEADACHES: 0
CHILLS: 0

## 2019-02-14 ASSESSMENT — MIFFLIN-ST. JEOR: SCORE: 1935.66

## 2019-02-14 NOTE — PATIENT INSTRUCTIONS
Preventive Health Recommendations  Male Ages 21 - 25     Yearly exam:             See your health care provider every year in order to  o   Review health changes.   o   Discuss preventive care.    o   Review your medicines if your doctor has prescribed any.    You should be tested each year for STDs (sexually transmitted diseases).     Talk to your provider about cholesterol testing.      If you are at risk for diabetes, you should have a diabetes test (fasting glucose).    Shots: Get a flu shot each year. Get a tetanus shot every 10 years.     Nutrition:    Eat at least 5 servings of fruits and vegetables daily.     Eat whole-grain bread, whole-wheat pasta and brown rice instead of white grains and rice.     Get adequate calcium and Vitamin D.     Lifestyle    Exercise for at least 150 minutes a week (30 minutes a day, 5 days a week). This will help you control your weight and prevent disease.     Limit alcohol to one drink per day.     No smoking.     Wear sunscreen to prevent skin cancer.     See your dentist every six months for an exam and cleaning.     During times of the year, breaks, summer - consider [employer] or http://www.[employer].Allih[employer].au and such resources for low cost training recommendations.  Could consider [employer] as a  if getting to the store is a barrier. Consider economics of eating out vs using boxed meals as you learn recipes.  Return in 2 months or later for HPV #2 then 4 months later for HPV#3.

## 2019-02-14 NOTE — NURSING NOTE
Screening Questionnaire for Adult Immunization    Are you sick today?   No   Do you have allergies to medications, food, a vaccine component or latex?   No   Have you ever had a serious reaction after receiving a vaccination?   No   Do you have a long-term health problem with heart disease, lung disease, asthma, kidney disease, metabolic disease (e.g. diabetes), anemia, or other blood disorder?   No   Do you have cancer, leukemia, HIV/AIDS, or any other immune system problem?   No   In the past 3 months, have you taken medications that affect  your immune system, such as prednisone, other steroids, or anticancer drugs; drugs for the treatment of rheumatoid arthritis, Crohn s disease, or psoriasis; or have you had radiation treatments?   No   Have you had a seizure, or a brain or other nervous system problem?   No   During the past year, have you received a transfusion of blood or blood     products, or been given immune (gamma) globulin or antiviral drug?   No   For women: Are you pregnant or is there a chance you could become        pregnant during the next month?   No   Have you received any vaccinations in the past 4 weeks?   No     Immunization questionnaire answers were all negative.        Per orders of Dr. Ross, injection of Adacel given by Stella Reid. Patient instructed to remain in clinic for 15 minutes afterwards, and to report any adverse reaction to me immediately.       Screening performed by Stella Reid on 2/14/2019 at 9:52 AM.

## 2019-02-14 NOTE — PROGRESS NOTES
SUBJECTIVE:   CC: Emeterio Mishra is an 23 year old male who presents for preventative health visit.     Physical   Annual:     Getting at least 3 servings of Calcium per day:  NO    Bi-annual eye exam:  NO    Dental care twice a year:  Yes    Sleep apnea or symptoms of sleep apnea:  None    Diet:  Regular (no restrictions)    Frequency of exercise:  None    Taking medications regularly:  Yes    Medication side effects:  Not applicable    Additional concerns today:  No    PHQ-2 Total Score: 0    He moved recently to [facility], so looking for a nearby provider.    Physical Activity: Works full time and is a full time student as well so is pretty fatigued by the end of the day. Just started school, so next 3 years is expected to be in college. This will be Fall and Spring. When not in school, enjoys walking at [employer]. They also have [employer] membership. He googles weight training and cardio plans. They do have trainers, but they are kind of expensive.  Nutrition: Mostly eats out, this is perhaps time and perhaps laziness. Did do boxed meals for a couple of weeks.    He has been out of citalopram 20mg and is feeling a little - takes this for anxiety and depression. Has hereditary family history of suicide. In high school, early college he struggled with negative self thoughts, anxiety, being worried about everything. He would get very anxious to the point of shaking.    He takes metadateCD when he needs to do homework, study for a test, or go to class which has been a challenge with balancing work and school. He takes it at lunch time when he has evening classes. Sometimes takes it in AM on weekend study days. Able to sleep despite taking this at noon.    Today's PHQ-2 Score:   PHQ-2 ( 1999 Pfizer) 2/14/2019   Q1: Little interest or pleasure in doing things 0   Q2: Feeling down, depressed or hopeless 0   PHQ-2 Score 0   Q1: Little interest or pleasure in doing things Not at all   Q2: Feeling down, depressed or  "hopeless Not at all   PHQ-2 Score 0       Abuse: Current or Past(Physical, Sexual or Emotional)- No  Do you feel safe in your environment? Yes    Social History     Tobacco Use     Smoking status: Never Smoker     Smokeless tobacco: Never Used     Tobacco comment: no smokers in the home   Substance Use Topics     Alcohol use: Yes     Comment: Socially      Alcohol Use 2/14/2019   If you drink alcohol do you typically have greater than 3 drinks per day OR greater than 7 drinks per week? No       Last PSA: No results found for: PSA    Reviewed orders with patient. Reviewed health maintenance and updated orders accordingly - Yes    Reviewed and updated as needed this visit by clinical staff  Tobacco  Allergies  Meds  Problems  Med Hx  Surg Hx  Fam Hx  Soc Hx          Reviewed and updated as needed this visit by Provider  Allergies  Meds  Problems          Review of Systems   Constitutional: Negative for chills and fever.   HENT: Negative for congestion, ear pain, hearing loss and sore throat.    Eyes: Negative for pain and visual disturbance.   Respiratory: Negative for cough and shortness of breath.    Cardiovascular: Negative for chest pain and palpitations.   Gastrointestinal: Negative for abdominal pain, constipation, diarrhea, heartburn, hematochezia and nausea.   Genitourinary: Negative for dysuria, frequency, genital sores, hematuria and urgency.   Musculoskeletal: Negative for arthralgias, joint swelling and myalgias.   Skin: Negative for rash.   Neurological: Negative for dizziness, weakness, headaches and paresthesias.   Psychiatric/Behavioral: Negative for mood changes. The patient is not nervous/anxious.          OBJECTIVE:   /89 (BP Location: Left arm, Patient Position: Sitting, Cuff Size: Adult Regular)   Pulse 87   Temp 98.4  F (36.9  C) (Oral)   Resp 18   Ht 1.727 m (5' 8\")   Wt 96.6 kg (213 lb)   SpO2 99%   BMI 32.39 kg/m      Physical Exam  GENERAL: healthy, alert and no " "distress  EYES: Eyes grossly normal to inspection, PERRL and conjunctivae and sclerae normal  HENT: ear canals and TM's normal, nose and mouth without ulcers or lesions  NECK: no adenopathy, no asymmetry, masses, or scars and thyroid normal to palpation  RESP: lungs clear to auscultation - no rales, rhonchi or wheezes  CV: regular rate and rhythm, normal S1 S2, no S3 or S4, no murmur, click or rub, no peripheral edema and peripheral pulses strong  ABDOMEN: soft, nontender, no hepatosplenomegaly, no masses and bowel sounds normal  MS: no gross musculoskeletal defects noted, no edema  SKIN: 2mm wart at base of 2nd toe, 4mm flesh colored papule on left aspect of forehead without eschar.  NEURO: Normal strength and tone, mentation intact and speech normal  PSYCH: mentation appears normal, affect normal/bright        ASSESSMENT/PLAN:   Emeterio was seen today for physical.    Diagnoses and all orders for this visit:    Routine history and physical examination of adult    Mild recurrent major depression (H)  -     citalopram (CELEXA) 20 MG tablet; Take 1 tablet (20 mg) by mouth daily    Attention deficit disorder (ADD) without hyperactivity  -     methylphenidate (METADATE CD) 10 MG CR capsule; Take 1 capsule (10 mg) by mouth every morning    Need for HPV vaccination  -     HC HPV VAC 9V 3 DOSE IM; Standing  -     HC HPV VAC 9V 3 DOSE IM    Atypical mole  -     DERMATOLOGY REFERRAL    Other orders  -     TDAP, IM (10 - 64 YRS) - Adacel    Continue metadate and celexa at current doses.    COUNSELING:   Reviewed preventive health counseling, as reflected in patient instructions       Regular exercise       Healthy diet/nutrition    BP Readings from Last 1 Encounters:   02/14/19 125/89     Estimated body mass index is 32.39 kg/m  as calculated from the following:    Height as of this encounter: 1.727 m (5' 8\").    Weight as of this encounter: 96.6 kg (213 lb).    During times of the year, breaks, summer - consider " [employer] or http://www.[employer].com.au and such resources for low cost training recommendations.  Could consider [employer] as a  if getting to the store is a barrier. Consider economics of eating out vs using boxed meals as you learn recipes.  Return in 2 months or later for HPV #2 then 4 months later for HPV#3.       reports that  has never smoked. he has never used smokeless tobacco.      Counseling Resources:  ATP IV Guidelines  Pooled Cohorts Equation Calculator  FRAX Risk Assessment  ICSI Preventive Guidelines  Dietary Guidelines for Americans, 2010  PICS Auditing's MyPlate  ASA Prophylaxis  Lung CA Screening    Han Murphy MD  [facility]

## 2019-02-19 NOTE — TELEPHONE ENCOUNTER
Prior Authorization Approval    Authorization Effective Date: 1/20/2019  Authorization Expiration Date: 2/19/2020  Medication: Methylphenidate HCL ER 10   Approved Dose/Quantity:    Reference #: [id number]   Insurance Company: [employer] - Phone [phone] Fax [phone]  Expected CoPay:       CoPay Card Available:      Foundation Assistance Needed:    Which Pharmacy is filling the prescription (Not needed for infusion/clinic administered): [facility] - [place], [place] - [address] [place] [address] [address]  Pharmacy Notified: Yes  Patient Notified: Yes

## 2019-02-19 NOTE — TELEPHONE ENCOUNTER
Central Prior Authorization Team   Phone: [phone]      PA Initiation    Medication: Methylphenidate HCL ER 10   Insurance Company: [employer]s - Phone [phone] Fax [phone]  Pharmacy Filling the Rx: [facility] - [place], [place] - [address]  Filling Pharmacy Phone: [phone]  Filling Pharmacy Fax:    Start Date: 2/19/2019

## 2019-02-19 NOTE — TELEPHONE ENCOUNTER
Prior Authorization Retail Medication Request    Medication/Dose: Methylphenidate HCL ER 10   ICD code (if different than what is on RX):    Previously Tried and Failed:   Rationale:      Insurance Name: [employer]  Insurance ID: [id number]      Pharmacy Information (if different than what is on RX)  [facility]  Phone:  [phone]

## 2019-03-01 RX ORDER — METHYLPHENIDATE HYDROCHLORIDE 10 MG/1
10 CAPSULE, EXTENDED RELEASE ORAL EVERY MORNING
Qty: 30 CAPSULE | Refills: 0 | Status: CANCELLED | OUTPATIENT
Start: 2019-03-01